# Patient Record
(demographics unavailable — no encounter records)

---

## 2018-01-01 NOTE — NBPN
===========================

Datetime: 2018 18:46

===========================

   

Nsy Prov Gen Appearance:  Within Normal Limits

Nsy Prov Skin:  Within Normal Limits

Nsy Prov Neuro:  Normal Tone; Mine; Grasp; Root; Suck

Nsy Prov Musculoskeletal:  Within Normal Limits; Full Range of Motion; Spontaneous Movement All Extre
mities; Intact Clavicles; Clavicles without Crepitus; Gluteal Folds Symmetrical; Spine Within Normal 
Limits; No Sacral Dimple/Cyst

Nsy Prov Head:  Normal Fontanelles; Normocephalic; Sutures WNL

Nsy Prov EENT:  Mouth Within Normal Limits; Ears Within Normal Limits; Eyes Within Normal Limits; Eye
s Red Reflex Bilaterally; Nose Within Normal Limits; Face Within Normal Limits

Nsy Prov Cardiovascular:  Within Normal Limits; Normal Pulses

Nsy Prov Respiratory:  Within Normal Limits

Nsy Prov GI:  Within Normal Limits; Soft; Normal Liver; Non Palpable Spleen; Patent Anus

Nsy Prov Umbilicus:  Within Normal Limits; Three Vessel Cord

Nsy Prov :  Normal Female Genitalia

Nsy Prov Impression:  Healthy Term ; Vital Signs Appropriate; Bonding Appropriately

Nsy Prov Plan:  Continue  Care

Nsy Prov Impression/Plan Details:  Term Female AGA

   VAginal Delivery. ROM 0.02

   MSAF

## 2018-01-01 NOTE — NBDCN
===========================

Datetime: 2018 09:15

===========================

   

Nsy Prov Gen Appearance:  Within Normal Limits

Nsy Prov Skin:  Within Normal Limits

Nsy Prov Neuro:  Normal Tone; [place]; Grasp; Root; Suck

Nsy Prov Musculoskeletal:  Within Normal Limits; Full Range of Motion; Spontaneous Movement All Extre
mities; Intact Clavicles; Clavicles without Crepitus; Gluteal Folds Symmetrical; Spine Within Normal 
Limits; No Sacral Dimple/Cyst

Nsy Prov Head:  Normal Fontanelles; Normocephalic; Sutures WNL

Nsy Prov EENT:  Mouth Within Normal Limits; Ears Within Normal Limits; Eyes Within Normal Limits; Eye
s Red Reflex Bilaterally; Nose Within Normal Limits; Face Within Normal Limits

Nsy Prov Cardiovascular:  Within Normal Limits; Normal Pulses

Nsy Prov Respiratory:  Within Normal Limits

Nsy Prov GI:  Within Normal Limits; Soft; Normal Liver; Non Palpable Spleen; Patent Anus

Nsy Prov Umbilicus:  Within Normal Limits; Three Vessel Cord

Nsy Prov :  Normal Female Genitalia

Nsy Prov Discharge:  Discharge Home Today; Healthy Term ; Vital Signs Appropriate; Bonding Ashli
ropriately; Voiding and Stooling; Appropriate Weight Loss

Prov Disch Referrals:  clinic

Nsy Prov Disch Comments:  term  female

Follow up in Weeks NB:  1 Week

   

===========================

Datetime: 2018 08:14

===========================

   

Lab, Bilirubin Transcutaneous:  5.0

Peak Bilirubin Transcutaneous:  5.0

Hearing Screen Result, NB:  Right Ear Pass; Left Ear Pass

Hearing Screen Status:  Hearing Screen Complete

Congenital Heart Screen:  Negative, Congenital Heart Screen Complete

   

===========================

Datetime: 2018 23:48

===========================

   

Bilirubin Risk Zone:  Low Risk Zone Less than 40th Percentile

Hepatitis B Vaccine NB:  2018 00:00 (Annotations: IM [id number]

   Glaxjoey Murraye

   Lot #[id number]

   Exp 21)

 Screenin2018 23:45 (Annotations: Slip # [id number])

   

===========================

Datetime: 2018 21:01

===========================

   

Hearing Screen Retest Result, NB:  Right Ear Pass; Left Ear Pass

   

===========================

Datetime: 2018 18:01

===========================

   

Lab, Bilirubin Transcutaneous DT:  2018 18:01

   

===========================

Datetime: 2018 07:30

===========================

   

Blood Type:  O Positive

Lab, Direct Marly:  Negative

   

===========================

Datetime: 2018 06:00

===========================

   

Formula Type:  Similac Advance

   

===========================

Datetime: 2018 23:20

===========================

   

Infant Birthdate and Time:  2018 18:16

Infant Sex - 1:  Female

Gestational Age at Deliv:  39.0

Method of Delivery:  Vaginal

Vacuum Extraction:  N/A

Forceps:  N/A

Apgar Score 1, NB:  9

Apgar Score5, NB:  9

Maternal Amniotic Fluid Color:  Light Meconium

Mother's Blood Type:  O Positive

Mother's Hepatitis B:  Negative

Mother's Gonorrhea:  Negative

Mother's Chlamydia:  Negative

Mother's RPR/VDRL:  Nonreactive

Mother's HIV+ Exposure Test MBL:  Negative

Mother's Hx Herpes:  No

Mother's Rubella:  Immune

Mother's Group Beta Strep:  Negative

Admission Birthweight, NB:  3150

Infant Weight (lb) MBL:  6

Infant Weight (oz) MBL:  15

Maternal Feeding Preference:  Both

   

===========================

Datetime: 2018 18:44

===========================

   

Discharge Weight gms NB:  3025

Discharge Weight lbs NB:  6

Discharge Weight oz NB:  11

Disch Follow Up With:  Dr SHAHBAZ Blanco

Follow up Appt with NB:  Office

   

===========================

Datetime: 2018 18:16

===========================

   

Length cms, NB:  19.25

Length in, NB:  7.58

Head Circumference (cm), NB:  33.00

Chest Circumference, NB:  32.50

## 2018-01-01 NOTE — NBPN
===========================

Datetime: 2018 09:09

===========================

   

Nsy Prov Gen Appearance:  Within Normal Limits

Nsy Prov Skin:  Within Normal Limits

Nsy Prov Neuro:  Normal Tone; Mine; Grasp; Root; Suck

Nsy Prov Musculoskeletal:  Within Normal Limits; Full Range of Motion; Spontaneous Movement All Extre
mities; Intact Clavicles; Clavicles without Crepitus; Gluteal Folds Symmetrical; Spine Within Normal 
Limits; No Sacral Dimple/Cyst

Nsy Prov Head:  Normal Fontanelles; Normocephalic; Sutures WNL

Nsy Prov EENT:  Mouth Within Normal Limits; Ears Within Normal Limits; Eyes Within Normal Limits; Eye
s Red Reflex Bilaterally; Nose Within Normal Limits; Face Within Normal Limits

Nsy Prov Cardiovascular:  Within Normal Limits; Normal Pulses

Nsy Prov Respiratory:  Within Normal Limits

Nsy Prov GI:  Within Normal Limits; Soft; Normal Liver; Non Palpable Spleen; Patent Anus

Nsy Prov Umbilicus:  Within Normal Limits; Three Vessel Cord

Nsy Prov :  Normal Female Genitalia

Nsy Prov Impression:  Healthy Term ; Vital Signs Appropriate; Bonding Appropriately; Voiding a
nd Stooling

Nsy Prov Plan:  Continue  Care

Nsy Prov Impression/Plan Details:  term  female

## 2018-01-01 NOTE — NBADN
===========================

Datetime: 2018 18:46

===========================

   

Nsy Prov Gen Appearance:  Within Normal Limits

Nsy Prov Gen Appearance:  Within Normal Limits

Nsy Prov Skin:  Within Normal Limits

Nsy Prov Neuro:  Normal Tone; [place]; Grasp; Root; Suck

Nsy Prov Musculoskeletal:  Within Normal Limits; Full Range of Motion; Spontaneous Movement All Extre
mities; Intact Clavicles; Clavicles without Crepitus; Gluteal Folds Symmetrical; Spine Within Normal 
Limits; No Sacral Dimple/Cyst

Nsy Prov Head:  Normal Fontanelles; Normocephalic; Sutures WNL

Nsy Prov EENT:  Mouth Within Normal Limits; Ears Within Normal Limits; Eyes Within Normal Limits; Eye
s Red Reflex Bilaterally; Nose Within Normal Limits; Face Within Normal Limits

Nsy Prov Cardiovascular:  Within Normal Limits; Normal Pulses

Nsy Prov Respiratory:  Within Normal Limits

Nsy Prov GI:  Within Normal Limits; Soft; Normal Liver; Non Palpable Spleen; Patent Anus

Nsy Prov Umbilicus:  Within Normal Limits; Three Vessel Cord

Nsy Prov :  Normal Female Genitalia

Nsy Prov Impression:  Healthy Term ; Vital Signs Appropriate; Bonding Appropriately

Nsy Prov Plan:  Continue  Care

Nsy Prov Impression/Plan Details:  Term Female AGA

   Vaginal Delivery. ROM 0.02

   MSAF

   

===========================

Datetime: 2018 18:44

===========================

   

Mother's Rule Inc Maternal Age:  Age >=35 at NIGHAT not specified

Mother's Rule Thalassemia:  Thalassemia History not specified

Mother's Rule Neural Tube Defect:  Neural Tube Defect History not specified

Mother's Rule Congenital Heart:  Congenital Heart Defect not specified

Mother's Rule Down Syndrome:  Down Syndrome History not specified

Mother's Rule Gabriel-Sachs:  Gabriel-Sachs History not specified

Mother's Rule [place]:  [place] History not specified

Mother's Rule Familial Dysauto:  Familial Dysautonomia History not specified

Mother's Rule Sickle Cell:  Sickle Cell Disease/Trait History not specified

Mother's Rule Hemophilia:  Hemophilia/Blood Disorder History not specified

Mother's Rule Muscular Dystrophy:  Muscular Dystrophy History not specified

Mother's Rule Cystic Fibrosis:  Cystic Fibrosis History not specified

Mother's Rule Peach's Chor:  Peach's Chorea History not specified

Mother's Rule Mental Retardation:  Mental Retardation/Autism History not specified

Mother's Rule Fragile X:  Fragile X Testing History not specified

Mother's Rule Oth Inherited DO:  Other Inherited/Chromosomal Disorders not specified

Mother's Rule Maternal Metabolic:  Maternal Metabolic History not specified

Mother's Rule FOB Birth Defects:  Pt Father or FOB Birth Defect History not specified

Mother's Rule Hx Stillborn MBL:  Loss/Stillborn History not specified

Mother's Rule Other Genetic Hx:  Other Genetic History not specified

Mother's Rule Drugs/Medications:  Drugs/Medications History not specified

Mother's Rule Gonorrhea:   Gonorrhea History Not Specified

Mother's Rule Chlamydia:  Chlamydia History not specified

Mother's Rule Syphilis:  Syphilis History not specified

Mother's Rule HIV/AIDS Exp:  HIV/Aids Exposure not specified

Mother's Rule HPV:  Human Papillomavirus History not specified

Mother's Rule Genital Herpes:  Genital Herpes not specified

Mother's Rule TB:  Tuberculosis History not specified

Mother's Rule Hepatitis:  Hepatitis History Not Specified

Mother's Rule Rash or Viral Ill:  Rash or Viral Illness History not specified

Mother's Rule Diabetes:  Diabetes History not specified

Mother's Rule Hypertension MBL:  History of Hypertension Not Specified

Mother's Rule Heart Disease:  Heart Disease History not specified

Mother's Rule Autoimmune:  Autoimmune Disorder History not specified

Mother's Rule Kidney Disease:  History of Kidney Disease/UTI not specified

Mother's Rule Neurologic:  Neurologic/Epilepsy Disorders not specified

Mother's Rule Psych Disorders:  Psychiatric Disorder History not specified

Mother's Rule Depression/PP Dep:  Depression/Postpartum Depression History not specified

Mother's Rule Hepaitis/tLiver:  History of Hepatitis/Liver Disease not specified

Mother's Rule Varicos/Phlebitis:  Varicosities/Phlebitis History Not Specified

Mother's Rule Thyroid Dysfunct:  Thyroid Dysfunction not specified

Mother's Rule Trauma/Violence:  Trauma/Violence History Not Specified

Mother's Rule Blood Transfusion:  Blood Transfusion History not specified

Mother's Rule Sensitization:  D (Rh) Sensitization not specified

Mother's Rule Pulmonary:  Pulmonary (Asthma, TB) History not specified

Mother's Rule Breast:  Breast History not specified

Mother's Rule Gyn Surgery:  Gyn Surgery Hx not specified

Mother's Rule Hosp/Surgery:  Hospitalization/Surgery History not specified

Mother's Rule Anesthetic Comp:  Anesthetic Complications Hx not specified

Mother's Rule Abnormal Pap:  Abnormal Pap Smear not specified

Mother's Rule Uterine Anomaly:  Uterine Anomaly/ROSA ISELA not specified

Mother's Rule Infertility:  Infertility Not Specified

Mother's Rule ART Treatment:  ART Treatment History not specified

Mother's Rule Other Med Disease:  Other Medical Diseases History not specified

Mother's Rule Family History:  Significant Family History not specified